# Patient Record
Sex: FEMALE | Race: ASIAN | NOT HISPANIC OR LATINO | URBAN - METROPOLITAN AREA
[De-identification: names, ages, dates, MRNs, and addresses within clinical notes are randomized per-mention and may not be internally consistent; named-entity substitution may affect disease eponyms.]

---

## 2018-08-10 ENCOUNTER — EMERGENCY (EMERGENCY)
Facility: HOSPITAL | Age: 33
LOS: 1 days | Discharge: HOME | End: 2018-08-10
Attending: EMERGENCY MEDICINE | Admitting: EMERGENCY MEDICINE
Payer: COMMERCIAL

## 2018-08-10 VITALS
SYSTOLIC BLOOD PRESSURE: 138 MMHG | RESPIRATION RATE: 16 BRPM | DIASTOLIC BLOOD PRESSURE: 76 MMHG | OXYGEN SATURATION: 100 % | HEART RATE: 92 BPM | TEMPERATURE: 98 F

## 2018-08-10 LAB — HCG SERPL-ACNC: < 5 MIU/ML — SIGNIFICANT CHANGE UP

## 2018-08-10 PROCEDURE — 99284 EMERGENCY DEPT VISIT MOD MDM: CPT

## 2018-08-10 PROCEDURE — 70450 CT HEAD/BRAIN W/O DYE: CPT | Mod: 26

## 2018-08-10 RX ORDER — ASPIRIN/CALCIUM CARB/MAGNESIUM 324 MG
162 TABLET ORAL DAILY
Qty: 0 | Refills: 0 | Status: DISCONTINUED | OUTPATIENT
Start: 2018-08-10 | End: 2018-08-14

## 2018-08-10 RX ADMIN — Medication 162 MILLIGRAM(S): at 15:01

## 2018-08-10 NOTE — ED PROVIDER NOTE - PROGRESS NOTE DETAILS
TEREZA henry: pt doing well, reports symptoms resolved and has no complaints at this time. CT head reviewed. Pt will be d.c with out patient fu. TEREZA henry: pt doing well, reports symptoms resolved and has no complaints at this time. CT head reviewed. Pt will be d.c with out patient fu. To FU Neuro and PMD and return for worsening sx or return of sx.

## 2018-08-10 NOTE — ED ADULT TRIAGE NOTE - CHIEF COMPLAINT QUOTE
Pt BIBEMS for numbness/tingling to Lt arm and Lt leg since 1010am this morning, denies any present pain, suffers from anxiety, denies headache/dizziness. FIT MD to lesia.

## 2018-08-10 NOTE — ED PROVIDER NOTE - OBJECTIVE STATEMENT
33y/o F with no pertinent PMHx, BIB EMS, presents to the ED c/o numbness and tingling to her L arm beginning while at rest. She then noticed L foot symptoms and called EMS. She was evaluated in ED within 40m of onset and had FIT eval, no stroke code called, finger stick 87. Her tingling begins in her L shoulder, radiates down her arm. Pt reports experiencing room-spinning dizziness and nausea for the past 4d, worse with movement. Her dizziness has improved since onset, but has not resolved. Pt was placed on steroids for infected mosquito bites 1.5mo ago, then had an allergic reaction to the steroids. She has since been taking 10mL of children's liquid Benadryl every day, to improvement of her hives. Denies vomiting, vision change, facial numbness/tingling, head injury, LOC, weakness, any other complaints.

## 2018-08-10 NOTE — ED PROVIDER NOTE - CRANIAL NERVE AND PUPILLARY EXAM
No objective sensory deficits. Subjective sensory change in sensation to L arm. Strength 5/5 in all extremities, -Romberg's./cranial nerves 2-12 intact

## 2018-08-10 NOTE — ED PROVIDER NOTE - ATTENDING CONTRIBUTION TO CARE
Seen and examined, pt. with sudden onset of extremity numbness and tingling without motor sx, persistent for many hours. No change in speech/vision, no change in gait, had FIT eval. at triage, no evolution of sx when examined again. Clear lungs, heart reg, no murmur, soft abd, NT to palp, ambulates easily, strength 5/5, subjective numbness to LUE to ant. shoulder, good distal pulses bilat., no edema, NT calves.

## 2018-08-10 NOTE — ED PROVIDER NOTE - MEDICAL DECISION MAKING DETAILS
33y/o F with 4d history of positional vertigo symptom, now with constant LUE sensory symptom. No objective finding. Will image with CT. Unable to refer for MRI due to metal tattoo pigment. Reassess, possible neuro eval.

## 2018-08-10 NOTE — ED PROVIDER NOTE - NEUROLOGICAL SENSATION
No objective sensory deficits. Subjective sensory change in sensation to L arm./present and normal in 4 extremities

## 2018-11-04 ENCOUNTER — EMERGENCY (EMERGENCY)
Facility: HOSPITAL | Age: 33
LOS: 1 days | Discharge: HOME | End: 2018-11-04
Attending: EMERGENCY MEDICINE | Admitting: EMERGENCY MEDICINE
Payer: COMMERCIAL

## 2018-11-04 VITALS
RESPIRATION RATE: 16 BRPM | HEART RATE: 89 BPM | TEMPERATURE: 99 F | DIASTOLIC BLOOD PRESSURE: 85 MMHG | OXYGEN SATURATION: 100 % | SYSTOLIC BLOOD PRESSURE: 145 MMHG

## 2018-11-04 LAB
ALBUMIN SERPL ELPH-MCNC: 4.6 G/DL — SIGNIFICANT CHANGE UP (ref 3.3–5)
ALP SERPL-CCNC: 77 U/L — SIGNIFICANT CHANGE UP (ref 40–120)
ALT FLD-CCNC: 25 U/L — SIGNIFICANT CHANGE UP (ref 4–33)
APTT BLD: 29.4 SEC — SIGNIFICANT CHANGE UP (ref 27.5–36.3)
AST SERPL-CCNC: 30 U/L — SIGNIFICANT CHANGE UP (ref 4–32)
BASOPHILS # BLD AUTO: 0.05 K/UL — SIGNIFICANT CHANGE UP (ref 0–0.2)
BASOPHILS NFR BLD AUTO: 0.7 % — SIGNIFICANT CHANGE UP (ref 0–2)
BILIRUB SERPL-MCNC: 0.5 MG/DL — SIGNIFICANT CHANGE UP (ref 0.2–1.2)
BUN SERPL-MCNC: 14 MG/DL — SIGNIFICANT CHANGE UP (ref 7–23)
CALCIUM SERPL-MCNC: 9.5 MG/DL — SIGNIFICANT CHANGE UP (ref 8.4–10.5)
CHLORIDE SERPL-SCNC: 106 MMOL/L — SIGNIFICANT CHANGE UP (ref 98–107)
CO2 SERPL-SCNC: 23 MMOL/L — SIGNIFICANT CHANGE UP (ref 22–31)
CREAT SERPL-MCNC: 0.71 MG/DL — SIGNIFICANT CHANGE UP (ref 0.5–1.3)
D DIMER BLD IA.RAPID-MCNC: < 150 NG/ML — SIGNIFICANT CHANGE UP
EOSINOPHIL # BLD AUTO: 0.11 K/UL — SIGNIFICANT CHANGE UP (ref 0–0.5)
EOSINOPHIL NFR BLD AUTO: 1.6 % — SIGNIFICANT CHANGE UP (ref 0–6)
GLUCOSE SERPL-MCNC: 95 MG/DL — SIGNIFICANT CHANGE UP (ref 70–99)
HCG SERPL-ACNC: < 5 MIU/ML — SIGNIFICANT CHANGE UP
HCT VFR BLD CALC: 38.9 % — SIGNIFICANT CHANGE UP (ref 34.5–45)
HGB BLD-MCNC: 12.2 G/DL — SIGNIFICANT CHANGE UP (ref 11.5–15.5)
IMM GRANULOCYTES # BLD AUTO: 0.03 # — SIGNIFICANT CHANGE UP
IMM GRANULOCYTES NFR BLD AUTO: 0.4 % — SIGNIFICANT CHANGE UP (ref 0–1.5)
INR BLD: 0.92 — SIGNIFICANT CHANGE UP (ref 0.88–1.17)
LYMPHOCYTES # BLD AUTO: 1.65 K/UL — SIGNIFICANT CHANGE UP (ref 1–3.3)
LYMPHOCYTES # BLD AUTO: 24.6 % — SIGNIFICANT CHANGE UP (ref 13–44)
MCHC RBC-ENTMCNC: 27.3 PG — SIGNIFICANT CHANGE UP (ref 27–34)
MCHC RBC-ENTMCNC: 31.4 % — LOW (ref 32–36)
MCV RBC AUTO: 87 FL — SIGNIFICANT CHANGE UP (ref 80–100)
MONOCYTES # BLD AUTO: 0.43 K/UL — SIGNIFICANT CHANGE UP (ref 0–0.9)
MONOCYTES NFR BLD AUTO: 6.4 % — SIGNIFICANT CHANGE UP (ref 2–14)
NEUTROPHILS # BLD AUTO: 4.44 K/UL — SIGNIFICANT CHANGE UP (ref 1.8–7.4)
NEUTROPHILS NFR BLD AUTO: 66.3 % — SIGNIFICANT CHANGE UP (ref 43–77)
NRBC # FLD: 0 — SIGNIFICANT CHANGE UP
PLATELET # BLD AUTO: 353 K/UL — SIGNIFICANT CHANGE UP (ref 150–400)
PMV BLD: 10.2 FL — SIGNIFICANT CHANGE UP (ref 7–13)
POTASSIUM SERPL-MCNC: 4.4 MMOL/L — SIGNIFICANT CHANGE UP (ref 3.5–5.3)
POTASSIUM SERPL-SCNC: 4.4 MMOL/L — SIGNIFICANT CHANGE UP (ref 3.5–5.3)
PROT SERPL-MCNC: 8.5 G/DL — HIGH (ref 6–8.3)
PROTHROM AB SERPL-ACNC: 10.5 SEC — SIGNIFICANT CHANGE UP (ref 9.8–13.1)
RBC # BLD: 4.47 M/UL — SIGNIFICANT CHANGE UP (ref 3.8–5.2)
RBC # FLD: 13.1 % — SIGNIFICANT CHANGE UP (ref 10.3–14.5)
SODIUM SERPL-SCNC: 141 MMOL/L — SIGNIFICANT CHANGE UP (ref 135–145)
TROPONIN T, HIGH SENSITIVITY: < 6 NG/L — SIGNIFICANT CHANGE UP (ref ?–14)
TSH SERPL-MCNC: 1.35 UIU/ML — SIGNIFICANT CHANGE UP (ref 0.27–4.2)
WBC # BLD: 6.71 K/UL — SIGNIFICANT CHANGE UP (ref 3.8–10.5)
WBC # FLD AUTO: 6.71 K/UL — SIGNIFICANT CHANGE UP (ref 3.8–10.5)

## 2018-11-04 PROCEDURE — 99284 EMERGENCY DEPT VISIT MOD MDM: CPT | Mod: 25

## 2018-11-04 PROCEDURE — 93010 ELECTROCARDIOGRAM REPORT: CPT

## 2018-11-04 PROCEDURE — 71046 X-RAY EXAM CHEST 2 VIEWS: CPT | Mod: 26

## 2018-11-04 NOTE — ED ADULT NURSE NOTE - CHIEF COMPLAINT QUOTE
pt c/o chest pain that started last night and woke her up this morning. reports an episode of palpitations that lasted for ~1 minute and resolved after using an anxiety andie. no n/v, sob. pmhx of anxiety (pain not consistent with previous anxiety attacks).

## 2018-11-04 NOTE — ED PROVIDER NOTE - OBJECTIVE STATEMENT
33 y/o F with Anemia presents to ED with c/o of chest pain since yesterday.  Pt states that while at rest yesterday that her chest began to hurt and she felt palpitations. Pt notes that the palpitations lasted for one minute. Pt states that she has no prior episodes of this pain, and that she was woken up by this chest pain this morning as well. Pt denies any SOB. 31 y/o F with Anemia presents to ED with c/o of chest pain since yesterday.  Pt states that while at rest today am (woke her up from sleep). pt experienced episode of  palpitations that lasted about 1-2 minutes and have since resolved. Pt states that she has no prior episodes of this pain, and that she was woken up by this chest pain this morning as well. Pt denies any SOB, jaw pain, LUE pain, diaphoresis. Pt admits to recent (for work since [place], last travel 2 wks ago). denies prolonged immobilization, personal/family hx of coagulopathies, non smoker, not on exogenous estrogen, denies cocaine use; never had cardiac w/u and denies family hx of early/unexplained cardiac deaths.

## 2018-11-04 NOTE — ED PROVIDER NOTE - ATTENDING CONTRIBUTION TO CARE
I, Jennifer Cabot, MD, have performed a history and physical exam of the patient and discussed their management with the ACP.  I reviewed the PA's note and agree with the documented findings and plan of care. My medical decision making and observations are found above.    Cabot: 32F with PMH of anxiety who comes in with 1d of L parasternal chest pain and palps.  No F/C/cough, no N/V/diaph, + recent travel to and from [place].  No prior PE/DVT, LE edema, OCPs.  On exam, HDS, NAD, MMM, eyes clear, lungs CTAB, + TTP at L parasternal region, heart sounds normal, abd soft, NT, ND, no CVAT, LEs without edema, wwp, skin normal temperature and color, neuro: alert and oriented, no focal deficits.  Likely costochondritis but will check CXR, trop, dimer, TSH, tx pain, reassess.

## 2018-11-04 NOTE — ED PROVIDER NOTE - CARE PLAN
Principal Discharge DX:	Chest pain  Assessment and plan of treatment:	PLEASE MAKE SURE THAT YOU FOLLOW UP WITH YOUR DOCTOR WITHIN 48HRS, TAKE IBUPROFEN 600MG AS NEEDED FOR THE PAIN. RETURN TO ER FOR WORSENING SYMPTOMS.

## 2018-11-04 NOTE — ED PROVIDER NOTE - MEDICAL DECISION MAKING DETAILS
midsternal CP, also resolved palpitations, no ekg change, no personal /family cardiac risk factors, perc negative -m labs including trop and d-diamer, cxr, pain control, will reasses. midsternal CP, also resolved palpitations, no ekg change, no personal /family cardiac risk factors, perc negative -m labs including trop and d-diamer, cxr, pain control, will reassess.    Cabot: 32F with PMH of anxiety who comes in with 1d of L parasternal chest pain and palps.  No F/C/cough, no N/V/diaph, + recent travel to and from [place].  No prior PE/DVT, LE edema, OCPs.  On exam, HDS, NAD, MMM, eyes clear, lungs CTAB, + TTP at L parasternal region, heart sounds normal, abd soft, NT, ND, no CVAT, LEs without edema, wwp, skin normal temperature and color, neuro: alert and oriented, no focal deficits.  Likely costochondritis but will check CXR, trop, dimer, TSH, tx pain, reassess.

## 2018-11-04 NOTE — ED ADULT NURSE NOTE - OBJECTIVE STATEMENT
pt received to intake rm 5.  seen and eval'd by md.  SL placed.  labs sent.  resting in nad.  very well appearing female in nad.

## 2018-11-04 NOTE — ED PROVIDER NOTE - NS_EDPROVIDERDISPOUSERTYPE_ED_A_ED
Scribe Attestation (For Scribes USE Only)... Attending Attestation (For Attendings USE Only)... Scribe Attestation (For Scribes USE Only).../Attending Attestation (For Attendings USE Only)...

## 2018-11-04 NOTE — ED ADULT TRIAGE NOTE - CHIEF COMPLAINT QUOTE
pt c/o chest pain that started last night and woke her up this morning. reports an episode of palpitations that lasted for ~1 minute and resolved after using an anxiety andie. pmhx of anxiety (pain not consistent with previous anxiety attacks). pt c/o chest pain that started last night and woke her up this morning. reports an episode of palpitations that lasted for ~1 minute and resolved after using an anxiety andie. no n/v, sob. pmhx of anxiety (pain not consistent with previous anxiety attacks).

## 2018-11-23 ENCOUNTER — EMERGENCY (EMERGENCY)
Facility: HOSPITAL | Age: 33
LOS: 1 days | Discharge: HOME | End: 2018-11-23
Admitting: EMERGENCY MEDICINE
Payer: COMMERCIAL

## 2018-11-23 VITALS
DIASTOLIC BLOOD PRESSURE: 97 MMHG | OXYGEN SATURATION: 98 % | TEMPERATURE: 99 F | HEART RATE: 77 BPM | RESPIRATION RATE: 16 BRPM | SYSTOLIC BLOOD PRESSURE: 152 MMHG

## 2018-11-23 PROCEDURE — 99282 EMERGENCY DEPT VISIT SF MDM: CPT

## 2018-11-23 NOTE — ED PROVIDER NOTE - NSFOLLOWUPINSTRUCTIONS_ED_ALL_ED_FT
Use a decongestant medication such as Zrytec or Sudafed  Use Flonase nasal spray as directed  Use Motrin 600mg every 6-8 hours for pain  Return to ER or PCP for fever, chills, worsening pain, discharge, bleeding to area.

## 2018-11-23 NOTE — ED PROVIDER NOTE - MEDICAL DECISION MAKING DETAILS
31 yo F here for right ear pain, + fluid behind TM without e/o infection. will advise decongestants, flonase, motrin prn for pain and f/u PCP. return precautions for infectious symptoms.

## 2018-11-23 NOTE — ED PROVIDER NOTE - ENMT, MLM
Airway patent, Nasal mucosa clear. Mouth with normal mucosa. Throat has no vesicles, no oropharyngeal exudates and uvula is midline.  +right ear with mild fluid behind TM, no erythema, no pus, no tragus tenderness, no swelling, no mastoid tenderness. no e/o AOM or AOE.

## 2018-11-23 NOTE — ED PROVIDER NOTE - OBJECTIVE STATEMENT
31 yo F denies pmhx here for right ear pain x 5 days. pt reports 5 days ago noted right ear pain, flew from [facility] to [facility] and pain worsened however then improved after flight. no meds taken. denies fever chills cp sob weakness discharge/bleeding HA dizziness vision change sore throat. +mild nasal congestion.

## 2019-02-11 ENCOUNTER — EMERGENCY (EMERGENCY)
Facility: HOSPITAL | Age: 34
LOS: 1 days | Discharge: HOME | End: 2019-02-11
Attending: EMERGENCY MEDICINE | Admitting: EMERGENCY MEDICINE
Payer: COMMERCIAL

## 2019-02-11 VITALS
HEART RATE: 91 BPM | TEMPERATURE: 98 F | SYSTOLIC BLOOD PRESSURE: 162 MMHG | RESPIRATION RATE: 16 BRPM | OXYGEN SATURATION: 100 % | DIASTOLIC BLOOD PRESSURE: 82 MMHG

## 2019-02-11 PROCEDURE — 99282 EMERGENCY DEPT VISIT SF MDM: CPT | Mod: 25

## 2019-02-11 PROCEDURE — 99053 MED SERV 10PM-8AM 24 HR FAC: CPT

## 2019-02-11 NOTE — ED ADULT TRIAGE NOTE - CHIEF COMPLAINT QUOTE
pt c/o ringing in right ear x2 months and a "liquid sensation" to right posterior head x2days- denies any visual changes, dizziness, weakness, headache, gait abnormalities, PMH anxiety

## 2019-02-12 VITALS
HEART RATE: 82 BPM | SYSTOLIC BLOOD PRESSURE: 141 MMHG | DIASTOLIC BLOOD PRESSURE: 83 MMHG | OXYGEN SATURATION: 100 % | RESPIRATION RATE: 16 BRPM

## 2019-02-12 NOTE — ED PROVIDER NOTE - PHYSICAL EXAMINATION
*Gen: NAD, AAO*3  *HEENT: NC/AT, MMM, TM gray/pearly on left side, cerumen impaction noted on right side, no mastoid redness/TTP, airway patent, trachea midline  *CV: RRR, S1/S2 present, no murmurs/rubs/gallops  *Resp: no respiratory distress, LCTAB, no wheezing/rales/rhonchi  *Abd: non-distended, soft N/Tx4, no guarding or rigidity  *Neuro: no focal neuro deficits, moving all limbs appropriately  ~ Jose E White M.D. *Gen: NAD, AAO*3  *HEENT: NC/AT, MMM, TM gray/pearly on left side, cerumen impaction noted on right side, no mastoid redness/TTP, airway patent, trachea midline  *CV: RRR, S1/S2 present, no murmurs/rubs/gallops  *Resp: no respiratory distress, LCTAB, no wheezing/rales/rhonchi  *Abd: non-distended, soft N/Tx4, no guarding or rigidity  *Neuro: no focal neuro deficits, moving all limbs appropriately  ~ Jose E White M.D.    PERRL, EOMI, no nystagmus. CN intact. Strength 5/5. Steady unassisted gait. No pronator drift. Sensation intact. No carotid bruits.

## 2019-02-12 NOTE — ED PROVIDER NOTE - OBJECTIVE STATEMENT
33 year-old female w/ no pertinent past medical history presents to the ED for right ear ringing/fullness sensation.  Ongoing since this AM; has not used any medications for this.  No fevers, recent throat/ear infections.  Patient also describes a "wet sensation" on the back of her head - mostly localized to the right side.  No lightheadedness, dizziness, chest pain. 33 year-old female w/ no pertinent past medical history presents to the ED for right ear ringing/fullness sensation.  Ongoing since this AM; has not used any medications for this.  No fevers, recent throat/ear infections.  Patient also describes a "wet sensation" on the back of her head - mostly localized to the right side.  No lightheadedness, dizziness, chest pain.  [place]: 33F no PMH p/w ringing/fullness to R ear for several mos, no acute changes. Came to ED today bec also feels vague "wet sensation" in R posterior head x3d, unable to explain in more detail. Denies vision changes, SOB/CP, vertigo. f/c, NVD, abd pain, urinary complaints.

## 2019-02-12 NOTE — ED PROVIDER NOTE - PROGRESS NOTE DETAILS
Christopher MACK: Patient had cerumen impaction noted in R ear - cleared with aid of ear wax removal stick; patient mentions significant improvement in symptoms.  Unable to clear cerumen completely, discussed using OTC cerumen removal agents.

## 2019-02-12 NOTE — ED PROVIDER NOTE - MEDICAL DECISION MAKING DETAILS
33 year-old female w/ no pertinent past medical history presents to the ED for right ear ringing/fullness sensation likely secondary to cerumen impaction.  Symptoms improved with disimpaction.  Plan for outpatient follow-up.

## 2019-02-12 NOTE — ED ADULT NURSE NOTE - OBJECTIVE STATEMENT
Pt received to room 28 a.o x 3 c/o tinnitus for approx 3 months and the feeling of fluid on the right side of posterior head x 2 days. Pt denies any injury, trauma, pain or pressure to the head. no noted lumps or injury to the head. Pt states she has some sensitivity to natural light.   Respirations even and unlabored. Lung sounds clear with equal chest rise bilaterally. ABD is soft, non tender, non distended with normal active bowel sounds No complaints of chest pain, headache, nausea, dizziness, vomiting  SOB, fever, chills verbalized.

## 2019-02-12 NOTE — ED PROVIDER NOTE - ATTENDING CONTRIBUTION TO CARE
33F no PMH p/w ringing/fullness to R ear for several mos, no acute changes. Came to ED today bec also feels vague "wet sensation" in R posterior head x3d. No other neuro or systemic symptoms. Vitals wnl, exam as above.  ddx: Cerumen impaction. Unclear etiology of "wet sensation"  Clinically no indicaiotn for further emergent ed w/u.   Removed parts of cerumen w/ marked improvement in pt's "fullness."  Comfortable for dc, outpt pmd/ent f/u.

## 2019-02-12 NOTE — ED ADULT NURSE NOTE - NSIMPLEMENTINTERV_GEN_ALL_ED
Implemented All Universal Safety Interventions:  [place] to call system. Call bell, personal items and telephone within reach. Instruct patient to call for assistance. Room bathroom lighting operational. Non-slip footwear when patient is off stretcher. Physically safe environment: no spills, clutter or unnecessary equipment. Stretcher in lowest position, wheels locked, appropriate side rails in place.

## 2019-02-12 NOTE — ED PROVIDER NOTE - NSFOLLOWUPINSTRUCTIONS_ED_ALL_ED_FT
You were seen in the ER for right ear fullness and found to have ear wax build-up.  This was partially cleared in the ER.  Recommend using over-the-counter ear wax removal agents for symptomatic control.  Follow-up with primary care doctor as needed.

## 2019-03-06 ENCOUNTER — EMERGENCY (EMERGENCY)
Facility: HOSPITAL | Age: 34
LOS: 1 days | Discharge: HOME | End: 2019-03-06
Attending: EMERGENCY MEDICINE | Admitting: EMERGENCY MEDICINE
Payer: COMMERCIAL

## 2019-03-06 VITALS
HEART RATE: 95 BPM | DIASTOLIC BLOOD PRESSURE: 75 MMHG | OXYGEN SATURATION: 99 % | RESPIRATION RATE: 20 BRPM | TEMPERATURE: 100 F | SYSTOLIC BLOOD PRESSURE: 133 MMHG

## 2019-03-06 PROCEDURE — 99283 EMERGENCY DEPT VISIT LOW MDM: CPT

## 2019-03-06 PROCEDURE — 71046 X-RAY EXAM CHEST 2 VIEWS: CPT | Mod: 26

## 2019-03-06 NOTE — ED PROVIDER NOTE - ENMT, MLM
+ cobblestoning in posterior oropharynx c/w nasal drip; + nasal congestion; Airway patent, Nasal mucosa clear. Throat has no vesicles, no oropharyngeal exudates and uvula is midline.

## 2019-03-06 NOTE — ED ADULT TRIAGE NOTE - CHIEF COMPLAINT QUOTE
states" I am having coughing with difficulty in breathing and has a h/o flu 2 weeks ago. patient is been travelling between [place] and [place] and here.

## 2019-03-06 NOTE — ED PROVIDER NOTE - OBJECTIVE STATEMENT
34 yo female with no pertinent PMH presents to the ED for 2 weeks of non-productive cough. Pt states she was feeling better then developed a non-productive cough again in the setting of + post-nasal drip. No n/v, fevers, chills, wheezing, hemoptysis, known sick contacts. + travel to [place] and [place] around the time symptoms began. No recent vaccinations, antibiotic use. Denies abd pain, back pain, urinary symptoms, diarrhea, rash. Pt well appearing, NAD. States she became concerned today when she developed left chest wall pain from coughing so hard. Denies PE risk factors other than plane ride but only 5-6 hour flight and walked around on the flight, no pleuritic chest pain, no extremity pain or swelling.

## 2019-03-06 NOTE — ED PROVIDER NOTE - RESPIRATORY, MLM
Left anterior chest wall tender to palpation, reproduces patient's pain; no signs of trauma, no crepitus or bruising; Breath sounds clear and equal bilaterally.

## 2019-03-06 NOTE — ED PROVIDER NOTE - CLINICAL SUMMARY MEDICAL DECISION MAKING FREE TEXT BOX
Clinically, pt presenting with URI in setting of post-nasal drip. Will obtain CXR to r/o pneumonia, pt has f/u with PMD this week.

## 2019-03-06 NOTE — ED PROVIDER NOTE - NSFOLLOWUPINSTRUCTIONS_ED_ALL_ED_FT
Take over the counter cough medicine as needed including cough drops, robittussin, DayQuil and/or Nyquil. Read the ingredients of the over the counter medication to ensure you are not taking duplicate medication (for example, Dayquil contains acetaminophen in it, aka Tylenol). For you post-nasal drip you may take saline nasal spray as directed on the packaging. Stay hydrated by drinking plenty of water. Follow up with your primary doctor within 24 to 48 hours. Return to the emergency department promptly for any urgent concerns.

## 2019-03-06 NOTE — ED PROVIDER NOTE - ATTENDING CONTRIBUTION TO CARE
I performed a face to face bedside interview with patient regarding history of present illness, review of symptoms and past medical history. I completed an independent physical exam.  I have discussed patient's plan of care.   I agree with note as stated above, having amended the EMR as needed to reflect my findings. I have discussed the assessment and plan of care.  This includes during the time I functioned as the attending physician for this patient.  Attending Contribution to Care: agree with plan of resident.  presents to the ED for 2 weeks of non-productive cough. Pt states she was feeling better then developed a non-productive cough again in the setting of + post-nasal drip. No n/v, fevers, chills, wheezing, hemoptysis, known sick contacts. + travel to [place]. just cough, no cp, PERC neg. xray neg for pna. pt stable for d/c. vss. hd stable.

## 2019-09-09 ENCOUNTER — EMERGENCY (EMERGENCY)
Facility: HOSPITAL | Age: 34
LOS: 1 days | Discharge: HOME | End: 2019-09-09
Admitting: EMERGENCY MEDICINE
Payer: COMMERCIAL

## 2019-09-09 VITALS
TEMPERATURE: 98 F | SYSTOLIC BLOOD PRESSURE: 131 MMHG | DIASTOLIC BLOOD PRESSURE: 82 MMHG | HEART RATE: 94 BPM | OXYGEN SATURATION: 100 % | RESPIRATION RATE: 15 BRPM

## 2019-09-09 LAB — HCG SERPL-ACNC: < 5 MIU/ML — SIGNIFICANT CHANGE UP

## 2019-09-09 PROCEDURE — 73110 X-RAY EXAM OF WRIST: CPT | Mod: 26,LT

## 2019-09-09 PROCEDURE — 99053 MED SERV 10PM-8AM 24 HR FAC: CPT

## 2019-09-09 PROCEDURE — 99283 EMERGENCY DEPT VISIT LOW MDM: CPT

## 2019-09-09 RX ORDER — IBUPROFEN 200 MG
600 TABLET ORAL ONCE
Refills: 0 | Status: COMPLETED | OUTPATIENT
Start: 2019-09-09 | End: 2019-09-09

## 2019-09-09 NOTE — ED PROVIDER NOTE - PHYSICAL EXAMINATION
TTP over the radial aspect of wrist and thumb, thumb inside fist with extension reproducing pain. No obvious deformity, Pulses intact, sensation and movement intact.

## 2019-09-09 NOTE — ED PROVIDER NOTE - OBJECTIVE STATEMENT
34 yo F with PMX of anemia and anxiety here with left wrist pain, atraumatic worse of the past 2 weeks, pt first began one month ago. Pt reports initially Tylenol and volt ran gel worked, however it stopped working. Pt now reporting a tingling sensation in her pinky on the left side. Pt pain is feeling pain, from radial distal forearm to thumb, worse with movement, typing, movement of thumb.  Denies injury, falls, weakness, neck pain, cp ,sob, fevers.

## 2019-09-09 NOTE — ED PROVIDER NOTE - PATIENT PORTAL LINK FT
You can access the FollowMyHealth Patient Portal offered by [facility] by registering at the following website: [email or web address]. By joining [employer]’s FollowMyHealth portal, you will also be able to view your health information using other applications (apps) compatible with our system.

## 2019-10-09 NOTE — ED PROVIDER NOTE - CARDIAC, MLM
Complex Repair And Melolabial Flap Text: The defect edges were debeveled with a #15 scalpel blade.  The primary defect was closed partially with a complex linear closure.  Given the location of the remaining defect, shape of the defect and the proximity to free margins a melolabial flap was deemed most appropriate for complete closure of the defect.  Using a sterile surgical marker, an appropriate advancement flap was drawn incorporating the defect and placing the expected incisions within the relaxed skin tension lines where possible.    The area thus outlined was incised deep to adipose tissue with a #15 scalpel blade.  The skin margins were undermined to an appropriate distance in all directions utilizing iris scissors. Normal rate, regular rhythm.  Heart sounds S1, S2.  No murmurs, rubs or gallops.

## 2020-01-01 NOTE — ED PROVIDER NOTE - PLAN OF CARE
Hyperbilirubinemia guideline PLEASE MAKE SURE THAT YOU FOLLOW UP WITH YOUR DOCTOR WITHIN 48HRS, TAKE IBUPROFEN 600MG AS NEEDED FOR THE PAIN. RETURN TO ER FOR WORSENING SYMPTOMS.

## 2020-01-04 ENCOUNTER — EMERGENCY (EMERGENCY)
Facility: HOSPITAL | Age: 35
LOS: 1 days | Discharge: HOME | End: 2020-01-04
Admitting: EMERGENCY MEDICINE
Payer: COMMERCIAL

## 2020-01-04 VITALS
DIASTOLIC BLOOD PRESSURE: 60 MMHG | SYSTOLIC BLOOD PRESSURE: 130 MMHG | RESPIRATION RATE: 18 BRPM | TEMPERATURE: 98 F | HEART RATE: 81 BPM | OXYGEN SATURATION: 100 %

## 2020-01-04 PROCEDURE — 99282 EMERGENCY DEPT VISIT SF MDM: CPT | Mod: 25

## 2020-01-04 PROCEDURE — 69210 REMOVE IMPACTED EAR WAX UNI: CPT | Mod: RT

## 2020-01-04 NOTE — ED PROVIDER NOTE - CARE PLAN
Principal Discharge DX:	Ear pain  Secondary Diagnosis:	Cerumen impaction  Secondary Diagnosis:	Wound of skin

## 2020-01-04 NOTE — ED PROVIDER NOTE - NSFOLLOWUPINSTRUCTIONS_ED_ALL_ED_FT
Please continue all home medications as directed. See your regular doctot/ENT within 72 hours for follow-up care. Return to ER for new or worsening symptoms.

## 2020-01-04 NOTE — ED PROVIDER NOTE - OBJECTIVE STATEMENT
34 year old female with no known PMHx here for suture removal which were placed 2 weeks ago after having mole removed on left upper chest. Pt has no f/u - from [place] just moved to [place]. Also complaining of intermittent right ear pain which started last night. Denies n/v/f/c, Cp, SOB, abdominal pain, dysuria, hematuria, melena, hematochezia, diarrhea, constipation, redness/discharge from/around wound.

## 2020-01-04 NOTE — ED PROVIDER NOTE - PATIENT PORTAL LINK FT
You can access the FollowMyHealth Patient Portal offered by [facility] by registering at the following website: [email or web address]. By joining [employer]’s FollowMyHealth portal, you will also be able to view your health information using other applications (apps) compatible with our system.

## 2020-01-04 NOTE — ED PROVIDER NOTE - CLINICAL SUMMARY MEDICAL DECISION MAKING FREE TEXT BOX
34 year old female with no known PMHx here for suture removal which were placed 2 weeks ago after having mole removed on left upper chest.  Plan: suture removal, cerumen disimpaction

## 2020-01-05 NOTE — ED PROCEDURE NOTE - CPROC ED TIME OUT STATEMENT1
“Patient's name, , procedure and correct site were confirmed during the [place] Timeout.”
“Patient's name, , procedure and correct site were confirmed during the [place] Timeout.”

## 2020-02-24 ENCOUNTER — EMERGENCY (EMERGENCY)
Facility: HOSPITAL | Age: 35
LOS: 1 days | Discharge: HOME | End: 2020-02-24
Attending: EMERGENCY MEDICINE
Payer: COMMERCIAL

## 2020-02-24 VITALS
OXYGEN SATURATION: 100 % | HEART RATE: 99 BPM | DIASTOLIC BLOOD PRESSURE: 85 MMHG | RESPIRATION RATE: 16 BRPM | SYSTOLIC BLOOD PRESSURE: 140 MMHG | TEMPERATURE: 99 F

## 2020-02-24 VITALS
DIASTOLIC BLOOD PRESSURE: 81 MMHG | OXYGEN SATURATION: 100 % | RESPIRATION RATE: 18 BRPM | TEMPERATURE: 100 F | SYSTOLIC BLOOD PRESSURE: 131 MMHG | HEART RATE: 92 BPM

## 2020-02-24 LAB
ALBUMIN SERPL ELPH-MCNC: 4.6 G/DL — SIGNIFICANT CHANGE UP (ref 3.3–5)
ALP SERPL-CCNC: 77 U/L — SIGNIFICANT CHANGE UP (ref 40–120)
ALT FLD-CCNC: 21 U/L — SIGNIFICANT CHANGE UP (ref 10–45)
ANION GAP SERPL CALC-SCNC: 14 MMOL/L — SIGNIFICANT CHANGE UP (ref 5–17)
APTT BLD: 27.9 SEC — SIGNIFICANT CHANGE UP (ref 27.5–36.3)
AST SERPL-CCNC: 25 U/L — SIGNIFICANT CHANGE UP (ref 10–40)
BASOPHILS # BLD AUTO: 0.06 K/UL — SIGNIFICANT CHANGE UP (ref 0–0.2)
BASOPHILS NFR BLD AUTO: 0.7 % — SIGNIFICANT CHANGE UP (ref 0–2)
BILIRUB SERPL-MCNC: 0.6 MG/DL — SIGNIFICANT CHANGE UP (ref 0.2–1.2)
BUN SERPL-MCNC: 12 MG/DL — SIGNIFICANT CHANGE UP (ref 7–23)
CALCIUM SERPL-MCNC: 9.6 MG/DL — SIGNIFICANT CHANGE UP (ref 8.4–10.5)
CHLORIDE SERPL-SCNC: 102 MMOL/L — SIGNIFICANT CHANGE UP (ref 96–108)
CO2 SERPL-SCNC: 20 MMOL/L — LOW (ref 22–31)
CREAT SERPL-MCNC: 0.67 MG/DL — SIGNIFICANT CHANGE UP (ref 0.5–1.3)
EOSINOPHIL # BLD AUTO: 0.12 K/UL — SIGNIFICANT CHANGE UP (ref 0–0.5)
EOSINOPHIL NFR BLD AUTO: 1.4 % — SIGNIFICANT CHANGE UP (ref 0–6)
GLUCOSE SERPL-MCNC: 91 MG/DL — SIGNIFICANT CHANGE UP (ref 70–99)
HCG SERPL-ACNC: <2 MIU/ML — SIGNIFICANT CHANGE UP
HCT VFR BLD CALC: 36.9 % — SIGNIFICANT CHANGE UP (ref 34.5–45)
HGB BLD-MCNC: 10.9 G/DL — LOW (ref 11.5–15.5)
IMM GRANULOCYTES NFR BLD AUTO: 0.3 % — SIGNIFICANT CHANGE UP (ref 0–1.5)
INR BLD: 0.99 RATIO — SIGNIFICANT CHANGE UP (ref 0.88–1.16)
LYMPHOCYTES # BLD AUTO: 2.15 K/UL — SIGNIFICANT CHANGE UP (ref 1–3.3)
LYMPHOCYTES # BLD AUTO: 24.5 % — SIGNIFICANT CHANGE UP (ref 13–44)
MCHC RBC-ENTMCNC: 25.9 PG — LOW (ref 27–34)
MCHC RBC-ENTMCNC: 29.5 GM/DL — LOW (ref 32–36)
MCV RBC AUTO: 87.6 FL — SIGNIFICANT CHANGE UP (ref 80–100)
MONOCYTES # BLD AUTO: 0.45 K/UL — SIGNIFICANT CHANGE UP (ref 0–0.9)
MONOCYTES NFR BLD AUTO: 5.1 % — SIGNIFICANT CHANGE UP (ref 2–14)
NEUTROPHILS # BLD AUTO: 5.97 K/UL — SIGNIFICANT CHANGE UP (ref 1.8–7.4)
NEUTROPHILS NFR BLD AUTO: 68 % — SIGNIFICANT CHANGE UP (ref 43–77)
NRBC # BLD: 0 /100 WBCS — SIGNIFICANT CHANGE UP (ref 0–0)
PLATELET # BLD AUTO: 390 K/UL — SIGNIFICANT CHANGE UP (ref 150–400)
POTASSIUM SERPL-MCNC: 4.5 MMOL/L — SIGNIFICANT CHANGE UP (ref 3.5–5.3)
POTASSIUM SERPL-SCNC: 4.5 MMOL/L — SIGNIFICANT CHANGE UP (ref 3.5–5.3)
PROT SERPL-MCNC: 8.5 G/DL — HIGH (ref 6–8.3)
PROTHROM AB SERPL-ACNC: 11.4 SEC — SIGNIFICANT CHANGE UP (ref 10–12.9)
RBC # BLD: 4.21 M/UL — SIGNIFICANT CHANGE UP (ref 3.8–5.2)
RBC # FLD: 13.5 % — SIGNIFICANT CHANGE UP (ref 10.3–14.5)
SODIUM SERPL-SCNC: 136 MMOL/L — SIGNIFICANT CHANGE UP (ref 135–145)
WBC # BLD: 8.78 K/UL — SIGNIFICANT CHANGE UP (ref 3.8–10.5)
WBC # FLD AUTO: 8.78 K/UL — SIGNIFICANT CHANGE UP (ref 3.8–10.5)

## 2020-02-24 PROCEDURE — 96374 THER/PROPH/DIAG INJ IV PUSH: CPT | Mod: XU

## 2020-02-24 PROCEDURE — 70498 CT ANGIOGRAPHY NECK: CPT | Mod: 26

## 2020-02-24 PROCEDURE — 70450 CT HEAD/BRAIN W/O DYE: CPT

## 2020-02-24 PROCEDURE — 70498 CT ANGIOGRAPHY NECK: CPT

## 2020-02-24 PROCEDURE — 85610 PROTHROMBIN TIME: CPT

## 2020-02-24 PROCEDURE — 85730 THROMBOPLASTIN TIME PARTIAL: CPT

## 2020-02-24 PROCEDURE — 80053 COMPREHEN METABOLIC PANEL: CPT

## 2020-02-24 PROCEDURE — 70496 CT ANGIOGRAPHY HEAD: CPT | Mod: 26

## 2020-02-24 PROCEDURE — 99285 EMERGENCY DEPT VISIT HI MDM: CPT

## 2020-02-24 PROCEDURE — 99284 EMERGENCY DEPT VISIT MOD MDM: CPT | Mod: 25

## 2020-02-24 PROCEDURE — 85027 COMPLETE CBC AUTOMATED: CPT

## 2020-02-24 PROCEDURE — 70496 CT ANGIOGRAPHY HEAD: CPT

## 2020-02-24 PROCEDURE — 96375 TX/PRO/DX INJ NEW DRUG ADDON: CPT | Mod: XU

## 2020-02-24 PROCEDURE — 84702 CHORIONIC GONADOTROPIN TEST: CPT

## 2020-02-24 RX ORDER — ACETAMINOPHEN 500 MG
1000 TABLET ORAL ONCE
Refills: 0 | Status: COMPLETED | OUTPATIENT
Start: 2020-02-24 | End: 2020-02-24

## 2020-02-24 RX ORDER — METOCLOPRAMIDE HCL 10 MG
10 TABLET ORAL ONCE
Refills: 0 | Status: COMPLETED | OUTPATIENT
Start: 2020-02-24 | End: 2020-02-24

## 2020-02-24 RX ORDER — SODIUM CHLORIDE 9 MG/ML
1000 INJECTION INTRAMUSCULAR; INTRAVENOUS; SUBCUTANEOUS ONCE
Refills: 0 | Status: COMPLETED | OUTPATIENT
Start: 2020-02-24 | End: 2020-02-24

## 2020-02-24 RX ADMIN — SODIUM CHLORIDE 1000 MILLILITER(S): 9 INJECTION INTRAMUSCULAR; INTRAVENOUS; SUBCUTANEOUS at 15:40

## 2020-02-24 RX ADMIN — Medication 400 MILLIGRAM(S): at 15:40

## 2020-02-24 RX ADMIN — Medication 10 MILLIGRAM(S): at 15:40

## 2020-02-24 NOTE — ED PROVIDER NOTE - PROGRESS NOTE DETAILS
[place] PGY3: signout received from Mau MACK sudden onset NGUYEN 1 hour PTA if CTA CTH wnl can be d/c'd without LP per signout team. CTH and CTA wnl labs wnl on reassessment pt states she feels improved no HA appears in NAD talking and breathing comfortably ambulatory without assist will dc with neuro f/u

## 2020-02-24 NOTE — ED PROVIDER NOTE - OBJECTIVE STATEMENT
34F hx frontal headaches, typically dull, presenting w/ right occipital severe sharp/throbbing sudden onset headache at approx 30min prior to arrival while at work, no exacerbated by any type of movement, no associated visual changes, dysarthria, numbness or weakness. No fevers. No recent illness. No chest pain or sob, no travel. + fam hx of ruptured aneurysm in mother. Feels nausea without vomiting.

## 2020-02-24 NOTE — ED PROVIDER NOTE - NSFOLLOWUPCLINICS_GEN_ALL_ED_FT
[facility]  Neurology  [address] [place] [address] [address] [place] [address]  Phone: [phone]  Fax:   Follow Up Time: 1-3 Days

## 2020-02-24 NOTE — ED PROVIDER NOTE - ATTENDING CONTRIBUTION TO CARE
pt with severe posterior headache noted while at work, with h/o headaches and nausea, family history of SAH, non focal neuro exam, vss, ct head ordered sts started over the past few hrs, pain control, reassess. no fever or uri sts.

## 2020-02-24 NOTE — ED ADULT NURSE NOTE - OBJECTIVE STATEMENT
Pt. is a 35 y/o female c/o sudden onset headache starting at 1400 today accompanied with nausea. States she has a family hx of brain aneurysm. Denies blurry vision or visual changes. Denies CP, SOB, V/D, numbness, tingling, cough, fever, chills, dizziness. A&Ox3. Breathing unlabored and spontaneous. Abdomen soft, nontender, nondistended. Full ROM and strength of extremities. Safety and comfort measures provided.

## 2020-02-24 NOTE — ED PROVIDER NOTE - NSFOLLOWUPINSTRUCTIONS_ED_ALL_ED_FT
1. You were seen for headache. A copy of any of your resulted labs, imaging, and findings have been provided to you.   2. Continue to take your home medications as prescribed. Take over the counter motrin 600 mg with food every six hours (do not exceed 3,200 mg in a 24 hour period) and supplement with tylenol 650 mg every six hours (do not exceed 4000 mg of tylenol in a 24 hour period and do not drink alcohol while taking tylenol) between the motrin dosages to have a layered effect. Consult a pharmacist or your primary care doctor with any questions.   3. Follow up with a neurologist and your primary care doctor within 48 hours to assess the symptoms you were seen for in the emergency department and to review all results from your visit. If you don't have a doctor, call [phone] to make an appointment.  4. Return immediately to the emergency department for new, persistent, or worsening symptoms or signs. Return immediately to the emergency department if you have chest pain, shortness of breath, loss of consciousness, worsening headache, or vomiting.   5. For your for health, you should make healthy food choices and be physically active. Also, you should not smoke or use drugs, and you should not drink alcohol in excess. Please visit [email or web address] for resources and more information.

## 2020-02-24 NOTE — ED ADULT NURSE REASSESSMENT NOTE - NS ED NURSE REASSESS COMMENT FT1
Pt. resting comfortably in bed. VSS/NAD. States headache improved after receiving medication, pt. ready to be discharged.

## 2020-02-24 NOTE — ED PROVIDER NOTE - PATIENT PORTAL LINK FT
You can access the FollowMyHealth Patient Portal offered by [facility] by registering at the following website: [email or web address]. By joining [employer]’s FollowMyHealth portal, you will also be able to view your health information using other applications (apps) compatible with our system.

## 2020-02-24 NOTE — ED PROVIDER NOTE - NS ED ROS FT
Gen: No fever, normal appetite  Eyes: No eye irritation or discharge  ENT: No ear pain, congestion, sore throat  Resp: No cough or trouble breathing  Cardiovascular: No chest pain or palpitation  Gastroenteric: No nausea/vomiting, diarrhea, constipation  :  No change in urine output; no dysuria  MS: No joint or muscle pain  Skin: No rashes  Neuro: +++ headache; no abnormal movements  Remainder negative, except as per the HPI

## 2020-02-24 NOTE — ED ADULT NURSE NOTE - NSIMPLEMENTINTERV_GEN_ALL_ED
Implemented All Universal Safety Interventions:  [place] to call system. Call bell, personal items and telephone within reach. Instruct patient to call for assistance. Room bathroom lighting operational. Non-slip footwear when patient is off stretcher. Physically safe environment: no spills, clutter or unnecessary equipment. Stretcher in lowest position, wheels locked, appropriate side rails in place.

## 2020-02-24 NOTE — ED PROVIDER NOTE - PHYSICAL EXAMINATION
Gen: AAOx3, NAD  Head: NCAT  ENT: Airway patent, moist mucous membranes, nasal passageways clear, no pharyngeal erythema or exudates, uvula midline, no cervical lymphadenopathy  Cardiac: Normal rate, normal rhythm, no murmurs/rubs/gallops appreciated  Respiratory: Lungs CTA B/L  Gastrointestinal: +BS, Abdomen soft, nontender, nondistended, no rebound, no guarding, no organomegaly   MSK: No gross abnormalities, FROM of all four extremities, no edema  HEME: Extremities warm, pulses intact and symmetrical in all four extremities  Skin: No rashes, no lesions  Neuro: No gross neurologic deficits, CN II-XII intact, no facial asymmetry, no dysarthria, no dysmetria, no drift, strength equal in all four extremities

## 2020-03-14 ENCOUNTER — EMERGENCY (EMERGENCY)
Facility: HOSPITAL | Age: 35
LOS: 1 days | Discharge: HOME | End: 2020-03-14
Attending: EMERGENCY MEDICINE | Admitting: EMERGENCY MEDICINE
Payer: COMMERCIAL

## 2020-03-14 VITALS
HEART RATE: 105 BPM | RESPIRATION RATE: 16 BRPM | OXYGEN SATURATION: 100 % | SYSTOLIC BLOOD PRESSURE: 148 MMHG | TEMPERATURE: 98 F | DIASTOLIC BLOOD PRESSURE: 90 MMHG

## 2020-03-14 VITALS — HEART RATE: 90 BPM | OXYGEN SATURATION: 99 %

## 2020-03-14 LAB

## 2020-03-14 PROCEDURE — 99283 EMERGENCY DEPT VISIT LOW MDM: CPT

## 2020-03-14 PROCEDURE — 71045 X-RAY EXAM CHEST 1 VIEW: CPT | Mod: 26

## 2020-03-14 NOTE — ED ADULT TRIAGE NOTE - CHIEF COMPLAINT QUOTE
Pt c/o subjective fever and dry cough since Wednesday, temperature not taken.  Pt states that she has not traveled, has been working from home without any sick contacts.  Pt states that she took Nyquil 1 hour ago and it made her feel SOB.  Pt awake alert in NAD, speaking full sentences, well appearing.  Past medical history: anxiety

## 2020-03-14 NOTE — ED PROVIDER NOTE - CLINICAL SUMMARY MEDICAL DECISION MAKING FREE TEXT BOX
concern for viral syndrome. no risk factors. low risk for covid. will rvp and cxr for pna.  likely dc

## 2020-03-14 NOTE — ED PROVIDER NOTE - NSFOLLOWUPINSTRUCTIONS_ED_ALL_ED_FT
Wash your hands    Self Isolate    Follow Covid discharge instructions Wash your hands    Self Isolate    Follow Covid discharge instructions given to you.    Call your doctor or return to the ER if you have new chest pain, shortness of breath, fevers, inability to eat or drink, or worsening of symptoms that brought you to the emergency room today.

## 2020-03-14 NOTE — ED PROVIDER NOTE - PROGRESS NOTE DETAILS
Pt comfortable at this time.  VSS. Will dc with self isolation and strict return precautions. Leonid pgy2.

## 2020-03-14 NOTE — ED PROVIDER NOTE - PATIENT PORTAL LINK FT
You can access the FollowMyHealth Patient Portal offered by [facility] by registering at the following website: [email or web address]. By joining [employer]’s FollowMyHealth portal, you will also be able to view your health information using other applications (apps) compatible with our system.

## 2020-03-14 NOTE — ED PROVIDER NOTE - OBJECTIVE STATEMENT
34 year old female [facility] anxiety presents with 2 days of subjective fever and cough. today has 2 second episode of sob now resolved.  sating well in room. no sick contacts. has been working from home.

## 2020-07-07 NOTE — ED ADULT NURSE NOTE - NS ED NURSE LEVEL OF CONSCIOUSNESS ORIENTATION
Oriented - self; Oriented - place; Oriented - time Advancement Flap (Double) Text: The defect edges were debeveled with a #15 scalpel blade.  Given the location of the defect and the proximity to free margins a double advancement flap was deemed most appropriate.  Using a sterile surgical marker, the appropriate advancement flaps were drawn incorporating the defect and placing the expected incisions within the relaxed skin tension lines where possible.    The area thus outlined was incised deep to adipose tissue with a #15 scalpel blade.  The skin margins were undermined to an appropriate distance in all directions utilizing iris scissors.

## 2022-03-02 NOTE — ED PROVIDER NOTE - CROS ED ENMT EARS POS
Products Recommended: Advised to wear spf 30+ daily. Reapply every 2 hours while outdoors. Seek shade when possible. Wear sun protective clothing and hats whenever possible. General Sunscreen Counseling: I recommended a broad spectrum sunscreen with a SPF of 30 or higher.  I explained that SPF 30 sunscreens block approximately 97 percent of the sun's harmful rays.  Sunscreens should be applied at least 15 minutes prior to expected sun exposure and then every 2 hours after that as long as sun exposure continues. If swimming or exercising sunscreen should be reapplied every 45 minutes to an hour after getting wet or sweating.  One ounce, or the equivalent of a shot glass full of sunscreen, is adequate to protect the skin not covered by a bathing suit. I also recommended a lip balm with a sunscreen as well. Sun protective clothing can be used in lieu of sunscreen but must be worn the entire time you are exposed to the sun's rays. Detail Level: Generalized right ear pain

## 2022-08-26 NOTE — ED ADULT NURSE NOTE - DOES PATIENT HAVE ADVANCE DIRECTIVE
Today we discussed close monitoring of vision in the right eye (due to amblyopia suspect). Continue with surgery with Dr. Diaz this fall. Return to clinic in December for a vision check.   
unknown

## 2023-02-20 NOTE — ED ADULT TRIAGE NOTE - TEMPERATURE IN CELSIUS (DEGREES C)
Message sent via [employer].   Jeanne Perry RN on 2/20/2023 at 2:19 PM     RE      ----- Message from Jazmín Wallace MD sent at 2/20/2023  1:59 PM CST -----  Could you please let her know that her TSH is elevated and she needs higher dose levothyroxine, I sent a new prescription for levothryoxine 175 mcg daily. Plan to repeat TFT in 2 month.    Thank you,  Jazmín Wallace MD  Endocrinology, Diabetes and Metabolism  [facility]         36.6

## 2023-05-04 NOTE — ED ADULT NURSE NOTE - NS ED NURSE IV DC DT
24-Feb-2020 18:30 Opzelura Counseling:  I discussed with the patient the risks of Opzelura including but not limited to nasopharngitis, bronchitis, ear infection, eosinophila, hives, diarrhea, folliculitis, tonsillitis, and rhinorrhea.  Taken orally, this medication has been linked to serious infections; higher rate of mortality; malignancy and lymphoproliferative disorders; major adverse cardiovascular events; thrombosis; thrombocytopenia, anemia, and neutropenia; and lipid elevations.
